# Patient Record
(demographics unavailable — no encounter records)

---

## 2024-11-01 NOTE — PHYSICAL EXAM
[Restricted in physically strenuous activity but ambulatory and able to carry out work of a light or sedentary nature] : Status 1- Restricted in physically strenuous activity but ambulatory and able to carry out work of a light or sedentary nature, e.g., light house work, office work [Obese] : obese [Normal] : affect appropriate [de-identified] :  hyperpigmented skin lesions

## 2024-11-01 NOTE — REASON FOR VISIT
[Follow-Up Visit] : a follow-up visit for [Blood Count Assessment] : blood count assessment [Family Member] : family member [Other: _____] : [unfilled] [FreeTextEntry2] : Essential Thrombocythemia

## 2024-11-01 NOTE — ASSESSMENT
[FreeTextEntry1] : 90-year-old female with hx of mild dementia, HTN. HLD, Hypothyroidism, Essential thrombocythemia.  # ET: restart Hydrea 500 mg daily. Workup done and Usman 2 [id number] was positive confirming diagnosis of ET. - Continue aspirin 81 mg daily.   6/27/24- Hb 11.8g/dl, Hct 36.9%, PLT 344K.  # B12 deficiency - Currently on sublingual b12 supplement  - HTN follow by Geriatrics Specialist at [facility].  - Neurology followed for dementia by neurologist on Aricept and Memantine  - DM- off therapy.  Greater than 50% of the encounter time was spent on counseling and coordination of care for ET and I have spent 40 minutes of face-to-face time with the patient.  RTC 4 months  [phone] Pretty

## 2024-11-01 NOTE — HISTORY OF PRESENT ILLNESS
[de-identified] : 87 year old female with hx of mild dementia, HTN. HLD, Hypothyroidism, thrombocytosis who was referred for thrombocytosis. Per patient's daughter, she was found to have elevated platelet count more than 10 years ago. She states that patient was brought to [facility] at the time and was started on Hydroxyurea. She never had a diagnostic bone marrow biopsy and does not recall being told she had Essential Thrombocytosis. Patient moved to [facility] but continued to followed with a Hematologist there and was continued on the hydrea. Three years ago patient moved back to the  but was following with her PCP. Recently, she has noticed increased hyperpigmented lesions on her skin for which she was encouraged by her PCP to return to Hematology. \par  \par  Patient underwent workup including a positive Jak2 mutation that confirmed diagnosis of Essential Thrombocytosis.  [de-identified] : Patient presents for follow up, accompanied by daughter (Pretty). Patient is on Hydroxyurea 500 mg daily.  Reports feeling well otherwise, notes no fevers, chills, night sweats, headaches, lightheadedness, chest pain or unintentional weight loss.   6/27/24- Hb 11.8g/dl, Hct 36.9%, PLT 344K.  No other changes in medical, surgical or social history since 2/6/2024  [0 - No Distress] : Distress Level: 0 [80: Normal activity with effort; some signs or symptoms of disease.] : 80: Normal activity with effort; some signs or symptoms of disease.